# Patient Record
Sex: FEMALE | Race: WHITE | NOT HISPANIC OR LATINO | Employment: OTHER | URBAN - METROPOLITAN AREA
[De-identification: names, ages, dates, MRNs, and addresses within clinical notes are randomized per-mention and may not be internally consistent; named-entity substitution may affect disease eponyms.]

---

## 2017-07-05 ENCOUNTER — TELEPHONE (OUTPATIENT)
Dept: OBGYN | Facility: CLINIC | Age: 77
End: 2017-07-05

## 2017-07-05 NOTE — TELEPHONE ENCOUNTER
Mailbox full - patient needs to reschedule her 7/13 appt with Dr York. Provider does not see patient's for prolapse. Please help schedule with Dr. Mcdonough, Dr Huggins, Dr Moise or Dr Rene Valencia,   [facility]

## 2017-11-20 ENCOUNTER — APPOINTMENT (OUTPATIENT)
Dept: GENERAL RADIOLOGY | Facility: CLINIC | Age: 77
End: 2017-11-20
Attending: EMERGENCY MEDICINE
Payer: COMMERCIAL

## 2017-11-20 ENCOUNTER — HOSPITAL ENCOUNTER (OUTPATIENT)
Facility: CLINIC | Age: 77
Setting detail: OBSERVATION
Discharge: HOME | End: 2017-11-21
Attending: EMERGENCY MEDICINE | Admitting: INTERNAL MEDICINE
Payer: COMMERCIAL

## 2017-11-20 ENCOUNTER — APPOINTMENT (OUTPATIENT)
Dept: CT IMAGING | Facility: CLINIC | Age: 77
End: 2017-11-20
Attending: EMERGENCY MEDICINE
Payer: COMMERCIAL

## 2017-11-20 DIAGNOSIS — R07.9 ACUTE CHEST PAIN: ICD-10-CM

## 2017-11-20 LAB
ALBUMIN SERPL-MCNC: 3.8 G/DL (ref 3.4–5)
ALP SERPL-CCNC: 65 U/L (ref 40–150)
ALT SERPL W P-5'-P-CCNC: 32 U/L (ref 0–50)
ANION GAP SERPL CALCULATED.3IONS-SCNC: 7 MMOL/L (ref 3–14)
AST SERPL W P-5'-P-CCNC: 27 U/L (ref 0–45)
BASOPHILS # BLD AUTO: 0 10E9/L (ref 0–0.2)
BASOPHILS NFR BLD AUTO: 0.2 %
BILIRUB SERPL-MCNC: 0.4 MG/DL (ref 0.2–1.3)
BUN SERPL-MCNC: 16 MG/DL (ref 7–30)
CALCIUM SERPL-MCNC: 9.4 MG/DL (ref 8.5–10.1)
CHLORIDE SERPL-SCNC: 104 MMOL/L (ref 94–109)
CO2 SERPL-SCNC: 27 MMOL/L (ref 20–32)
CREAT SERPL-MCNC: 0.69 MG/DL (ref 0.52–1.04)
DIFFERENTIAL METHOD BLD: NORMAL
EOSINOPHIL # BLD AUTO: 0 10E9/L (ref 0–0.7)
EOSINOPHIL NFR BLD AUTO: 0.4 %
ERYTHROCYTE [DISTWIDTH] IN BLOOD BY AUTOMATED COUNT: 13 % (ref 10–15)
GFR SERPL CREATININE-BSD FRML MDRD: 82 ML/MIN/1.7M2
GLUCOSE SERPL-MCNC: 79 MG/DL (ref 70–99)
HCT VFR BLD AUTO: 41.6 % (ref 35–47)
HGB BLD-MCNC: 14.7 G/DL (ref 11.7–15.7)
IMM GRANULOCYTES # BLD: 0 10E9/L (ref 0–0.4)
IMM GRANULOCYTES NFR BLD: 0.1 %
INTERPRETATION ECG - MUSE: NORMAL
INTERPRETATION ECG - MUSE: NORMAL
LYMPHOCYTES # BLD AUTO: 3.5 10E9/L (ref 0.8–5.3)
LYMPHOCYTES NFR BLD AUTO: 35.8 %
MCH RBC QN AUTO: 31.6 PG (ref 26.5–33)
MCHC RBC AUTO-ENTMCNC: 35.3 G/DL (ref 31.5–36.5)
MCV RBC AUTO: 90 FL (ref 78–100)
MONOCYTES # BLD AUTO: 0.8 10E9/L (ref 0–1.3)
MONOCYTES NFR BLD AUTO: 8.1 %
NEUTROPHILS # BLD AUTO: 5.4 10E9/L (ref 1.6–8.3)
NEUTROPHILS NFR BLD AUTO: 55.4 %
NRBC # BLD AUTO: 0 10*3/UL
NRBC BLD AUTO-RTO: 0 /100
PLATELET # BLD AUTO: 238 10E9/L (ref 150–450)
POTASSIUM SERPL-SCNC: 3.7 MMOL/L (ref 3.4–5.3)
PROT SERPL-MCNC: 7.5 G/DL (ref 6.8–8.8)
RBC # BLD AUTO: 4.65 10E12/L (ref 3.8–5.2)
SODIUM SERPL-SCNC: 138 MMOL/L (ref 133–144)
TROPONIN I SERPL-MCNC: <0.015 UG/L (ref 0–0.04)
WBC # BLD AUTO: 9.8 10E9/L (ref 4–11)

## 2017-11-20 PROCEDURE — 25000128 H RX IP 250 OP 636: Performed by: EMERGENCY MEDICINE

## 2017-11-20 PROCEDURE — 84484 ASSAY OF TROPONIN QUANT: CPT | Performed by: EMERGENCY MEDICINE

## 2017-11-20 PROCEDURE — 71260 CT THORAX DX C+: CPT

## 2017-11-20 PROCEDURE — 85025 COMPLETE CBC W/AUTO DIFF WBC: CPT | Performed by: EMERGENCY MEDICINE

## 2017-11-20 PROCEDURE — 99285 EMERGENCY DEPT VISIT HI MDM: CPT | Mod: 25

## 2017-11-20 PROCEDURE — 25000132 ZZH RX MED GY IP 250 OP 250 PS 637: Performed by: EMERGENCY MEDICINE

## 2017-11-20 PROCEDURE — 80053 COMPREHEN METABOLIC PANEL: CPT | Performed by: EMERGENCY MEDICINE

## 2017-11-20 PROCEDURE — 84443 ASSAY THYROID STIM HORMONE: CPT | Performed by: EMERGENCY MEDICINE

## 2017-11-20 PROCEDURE — 93005 ELECTROCARDIOGRAM TRACING: CPT

## 2017-11-20 PROCEDURE — G0378 HOSPITAL OBSERVATION PER HR: HCPCS

## 2017-11-20 PROCEDURE — 25000125 ZZHC RX 250: Performed by: EMERGENCY MEDICINE

## 2017-11-20 PROCEDURE — 93005 ELECTROCARDIOGRAM TRACING: CPT | Mod: 76

## 2017-11-20 PROCEDURE — 71020 XR CHEST 2 VW: CPT

## 2017-11-20 PROCEDURE — 99220 ZZC INITIAL OBSERVATION CARE,LEVL III: CPT | Performed by: INTERNAL MEDICINE

## 2017-11-20 RX ORDER — NITROGLYCERIN 0.4 MG/1
0.4 TABLET SUBLINGUAL EVERY 5 MIN PRN
Status: COMPLETED | OUTPATIENT
Start: 2017-11-20 | End: 2017-11-20

## 2017-11-20 RX ORDER — MULTIPLE VITAMINS W/ MINERALS TAB 9MG-400MCG
1 TAB ORAL DAILY
COMMUNITY

## 2017-11-20 RX ORDER — NITROGLYCERIN 0.4 MG/1
0.4 TABLET SUBLINGUAL ONCE
Status: COMPLETED | OUTPATIENT
Start: 2017-11-20 | End: 2017-11-20

## 2017-11-20 RX ORDER — IOPAMIDOL 755 MG/ML
61 INJECTION, SOLUTION INTRAVASCULAR ONCE
Status: COMPLETED | OUTPATIENT
Start: 2017-11-20 | End: 2017-11-20

## 2017-11-20 RX ADMIN — IOPAMIDOL 61 ML: 755 INJECTION, SOLUTION INTRAVENOUS at 21:18

## 2017-11-20 RX ADMIN — NITROGLYCERIN 0.4 MG: 0.4 TABLET SUBLINGUAL at 19:42

## 2017-11-20 RX ADMIN — NITROGLYCERIN 0.4 MG: 0.4 TABLET SUBLINGUAL at 19:28

## 2017-11-20 RX ADMIN — SODIUM CHLORIDE 87 ML: 9 INJECTION, SOLUTION INTRAVENOUS at 21:18

## 2017-11-20 RX ADMIN — NITROGLYCERIN 0.4 MG: 0.4 TABLET SUBLINGUAL at 20:49

## 2017-11-20 RX ADMIN — NITROGLYCERIN 0.4 MG: 0.4 TABLET SUBLINGUAL at 19:35

## 2017-11-20 ASSESSMENT — ENCOUNTER SYMPTOMS
SHORTNESS OF BREATH: 0
WEAKNESS: 0
LIGHT-HEADEDNESS: 1
COUGH: 0

## 2017-11-20 NOTE — IP AVS SNAPSHOT
MRN:[id number]                      After Visit Summary   11/20/2017    Angelia Hughes    MRN: [id number]           Thank you!     Thank you for choosing [facility] for your care. Our goal is always to provide you with excellent care. Hearing back from our patients is one way we can continue to improve our services. Please take a few minutes to complete the written survey that you may receive in the mail after you visit with us. Thank you!        Patient Information     Date Of Birth          1940        Designated Caregiver       Most Recent Value    Caregiver    Will someone help with your care after discharge? no      About your hospital stay     You were admitted on:  November 20, 2017 You last received care in the:  [facility]    You were discharged on:  November 21, 2017        Reason for your hospital stay       Observation hospitalization for evaluation of chest pain.  Your stress echocardiogram was normal.                  Who to Call     For medical emergencies, please call 911.  For non-urgent questions about your medical care, please call your primary care provider or clinic, [phone]          Attending Provider     Provider Specialty    Vikki Seymour MD Emergency Medicine    Galvan, Lance Murillo MD Internal Medicine       Primary Care Provider Office Phone # Fax #    Kandis Madrid [phone]      After Care Instructions     Activity       Your activity upon discharge: activity as tolerated            Diet       Follow this diet upon discharge: -Low sodium, low cholesterol diet.                  Follow-up Appointments     Follow-up and recommended labs and tests        Follow up with primary care provider, Kandis Madrid MD, next week for hospital follow- up and evaluation of non-cardiac causes of chest pain.                  Pending Results     No orders found for last 3 day(s).            Statement of Approval     Ordered     "      17 1024  I have reviewed and agree with all the recommendations and orders detailed in this document.  EFFECTIVE NOW     Approved and electronically signed by:  Blaine Cunningham PA             Admission Information     Date & Time Provider Department Dept. Phone    2017 Lance Galvan MD [facility] Observ[facility] [phone]      Your Vitals Were     Blood Pressure Pulse Temperature Respirations Height Weight    159/84 (BP Location: Right arm) 83 97.7  F (36.5  C) (Oral) 16 1.626 m (5' 4\") 70.3 kg (154 lb 14.4 oz)    Pulse Oximetry BMI (Body Mass Index)                97% 26.59 kg/m2          MyChart Information     Populr lets you send messages to your doctor, view your test results, renew your prescriptions, schedule appointments and more. To sign up, go to [email or web address] . Click on \"Log in\" on the left side of the screen, which will take you to the Welcome page. Then click on \"Sign up Now\" on the right side of the page.     You will be asked to enter the access code listed below, as well as some personal information. Please follow the directions to create your username and password.     Your access code is: [email or web address]  Expires: 2018 10:03 PM     Your access code will  in 90 days. If you need help or a new code, please call your [facility] clinic or [phone].        Care EveryWhere ID     This is your Care EveryWhere ID. This could be used by other organizations to access your [facility] medical records  [id number]        Equal Access to Services     : Hadii marilee Gaona, waaxda luqadaha, qaybta kaalpaco neri . So [facility] [phone].    ATENCIÓN: Si habla español, tiene a lala disposición servicios gratuitos de asistencia lingüística. Llame al [phone].    We comply with applicable federal civil rights laws and [place] laws. We do not discriminate on the basis of race, color, national " origin, age, disability, sex, sexual orientation, or gender identity.               Review of your medicines      CONTINUE these medicines which have NOT CHANGED        Dose / Directions    ASPIRIN EC PO        Dose:  325 mg   Take 325 mg by mouth daily   Refills:  0       * multivitamin, therapeutic with minerals Tabs tablet        Dose:  1 tablet   Take 1 tablet by mouth daily   Refills:  0       * MACULAR HEALTH FORMULA PO        Dose:  1 tablet   Take 1 tablet by mouth daily   Refills:  0       * Notice:  This list has 2 medication(s) that are the same as other medications prescribed for you. Read the directions carefully, and ask your doctor or other care provider to review them with you.             Protect others around you: Learn how to safely use, store and throw away your medicines at www.[employer].org.             Medication List: This is a list of all your medications and when to take them. Check marks below indicate your daily home schedule. Keep this list as a reference.      Medications           Morning Afternoon Evening Bedtime As Needed    ASPIRIN EC PO   Take 325 mg by mouth daily   Last time this was given:  325 mg on 11/21/2017  9:01 AM                                * multivitamin, therapeutic with minerals Tabs tablet   Take 1 tablet by mouth daily                                * MACULAR HEALTH FORMULA PO   Take 1 tablet by mouth daily                                * Notice:  This list has 2 medication(s) that are the same as other medications prescribed for you. Read the directions carefully, and ask your doctor or other care provider to review them with you.

## 2017-11-20 NOTE — ED AVS SNAPSHOT
Emergency Department    [address] [place] [address]    Phone:  [phone]    Fax:  [phone]                                       Angelia Hughes   MRN: [id number]    Department:   Emergency Department   Date of Visit:  11/20/2017           After Visit Summary Signature Page     I have received my discharge instructions, and my questions have been answered. I have discussed any challenges I see with this plan with the nurse or doctor.    ..........................................................................................................................................  Patient/Patient Representative Signature      ..........................................................................................................................................  Patient Representative Print Name and Relationship to Patient    ..................................................               ................................................  Date                                            Time    ..........................................................................................................................................  Reviewed by Signature/Title    ...................................................              ..............................................  Date                                                            Time

## 2017-11-20 NOTE — IP AVS SNAPSHOT
[facility]    [address] [place] [address]    Phone:  [phone]                                       After Visit Summary   11/20/2017    Angelia Hughes    MRN: [id number]           After Visit Summary Signature Page     I have received my discharge instructions, and my questions have been answered. I have discussed any challenges I see with this plan with the nurse or doctor.    ..........................................................................................................................................  Patient/Patient Representative Signature      ..........................................................................................................................................  Patient Representative Print Name and Relationship to Patient    ..................................................               ................................................  Date                                            Time    ..........................................................................................................................................  Reviewed by Signature/Title    ...................................................              ..............................................  Date                                                            Time

## 2017-11-20 NOTE — ED AVS SNAPSHOT
Emergency Department    [address] [facility] [address]    Phone:  [phone]    Fax:  [phone]                                       Angelia Hughes   MRN: [id number]    Department:   Emergency Department   Date of Visit:  11/20/2017           Patient Information     Date Of Birth          1940        Your diagnoses for this visit were:     Acute chest pain        You were seen by Vikki Seymour MD.      24 Hour Appointment Hotline       To make an appointment at any [facility], call [phone] ([phone]). If you don't have a family doctor or clinic, we will help you find one. [facility] clinics are conveniently located to serve the needs of you and your family.             Review of your medicines      Our records show that you are taking the medicines listed below. If these are incorrect, please call your family doctor or clinic.        Dose / Directions Last dose taken    meclizine 25 MG tablet   Commonly known as:  ANTIVERT   Dose:  25 mg   Quantity:  30 tablet        Take 1 tablet (25 mg) by mouth every 6 hours as needed for dizziness   Refills:  1        SIMVASTATIN PO   Dose:  5 mg        Take 5 mg by mouth At Bedtime   Refills:  0                Procedures and tests performed during your visit     CBC with platelets + differential    CT Chest Pulmonary Embolism w Contrast    Chest XR,  PA & LAT    Comprehensive metabolic panel    EKG 12 lead    EKG 12-lead, tracing only    Troponin I (now)      Orders Needing Specimen Collection     None      Pending Results     Date and Time Order Name Status Description    11/20/2017 2040 CT Chest Pulmonary Embolism w Contrast Preliminary             Pending Culture Results     No orders found from 11/18/2017 to 11/21/2017.            Pending Results Instructions     If you had any lab results that were not finalized at the time of your Discharge, you can call the ED Lab Result RN at [phone]. You will be  contacted by this team for any positive Lab results or changes in treatment. The nurses are available 7 days a week from 10A to 6:30P.  You can leave a message 24 hours per day and they will return your call.        Test Results From Your Hospital Stay        11/20/2017  7:36 PM      Component Results     Component Value Ref Range & Units Status    WBC 9.8 4.0 - 11.0 10e9/L Final    RBC Count 4.65 3.8 - 5.2 10e12/L Final    Hemoglobin 14.7 11.7 - 15.7 g/dL Final    Hematocrit 41.6 35.0 - 47.0 % Final    MCV 90 78 - 100 fl Final    MCH 31.6 26.5 - 33.0 pg Final    MCHC 35.3 31.5 - 36.5 g/dL Final    RDW 13.0 10.0 - 15.0 % Final    Platelet Count 238 150 - 450 10e9/L Final    Diff Method Automated Method  Final    % Neutrophils 55.4 % Final    % Lymphocytes 35.8 % Final    % Monocytes 8.1 % Final    % Eosinophils 0.4 % Final    % Basophils 0.2 % Final    % Immature Granulocytes 0.1 % Final    Nucleated RBCs 0 0 /100 Final    Absolute Neutrophil 5.4 1.6 - 8.3 10e9/L Final    Absolute Lymphocytes 3.5 0.8 - 5.3 10e9/L Final    Absolute Monocytes 0.8 0.0 - 1.3 10e9/L Final    Absolute Eosinophils 0.0 0.0 - 0.7 10e9/L Final    Absolute Basophils 0.0 0.0 - 0.2 10e9/L Final    Abs Immature Granulocytes 0.0 0 - 0.4 10e9/L Final    Absolute Nucleated RBC 0.0  Final         11/20/2017  7:55 PM      Component Results     Component Value Ref Range & Units Status    Sodium 138 133 - 144 mmol/L Final    Potassium 3.7 3.4 - 5.3 mmol/L Final    Chloride 104 94 - 109 mmol/L Final    Carbon Dioxide 27 20 - 32 mmol/L Final    Anion Gap 7 3 - 14 mmol/L Final    Glucose 79 70 - 99 mg/dL Final    Urea Nitrogen 16 7 - 30 mg/dL Final    Creatinine 0.69 0.52 - 1.04 mg/dL Final    GFR Estimate 82 >60 mL/min/1.7m2 Final    Non  GFR Calc    GFR Estimate If Black >90 >60 mL/min/1.7m2 Final    African American GFR Calc    Calcium 9.4 8.5 - 10.1 mg/dL Final    Bilirubin Total 0.4 0.2 - 1.3 mg/dL Final    Albumin 3.8 3.4 - 5.0 g/dL  Final    Protein Total 7.5 6.8 - 8.8 g/dL Final    Alkaline Phosphatase 65 40 - 150 U/L Final    ALT 32 0 - 50 U/L Final    AST 27 0 - 45 U/L Final         11/20/2017  7:55 PM      Component Results     Component Value Ref Range & Units Status    Troponin I ES <0.015 0.000 - 0.045 ug/L Final    The 99th percentile for upper reference range is 0.045 ug/L.  Troponin values   in the range of 0.045 - 0.120 ug/L may be associated with risks of adverse   clinical events.           11/20/2017  8:29 PM      Narrative     CHEST TWO VIEWS    11/20/2017 8:07 PM     HISTORY: Chest pain.    COMPARISON: None.        Impression     IMPRESSION: Cardiomediastinal silhouette within normal limits. No  pleural effusion. No pneumothorax identified. No focal airspace  opacities. Mild scoliotic curvature of the spine.     LANDY NY MD         11/20/2017  9:44 PM      Narrative     CT CHEST WITH CONTRAST   11/20/2017  9:26 PM     HISTORY: Hypoxia. Chest pain.    COMPARISON: None.    TECHNIQUE: Following the uneventful administration of 61 mL Isovue  -370 intravenous contrast, helical sections were acquired through the  lungs according to the pulmonary embolism protocol. Coronal  reconstructions were generated. Radiation dose for this scan was  reduced using automated exposure control, adjustment of the mA and/or  kV according to the patient's size, or iterative reconstruction  technique.    FINDINGS: No visualized pulmonary embolus. The thoracic aorta is  normal in caliber without dissection. Atherosclerotic calcification in  the thoracic aorta and coronary arteries.    Mild atelectasis in the dependent aspects of both lungs. The lungs are  otherwise clear. No pleural or pericardial effusion. No enlarged lymph  nodes in the chest. 1.8 cm left lobe of the thyroid nodule. Small  hiatal hernia.    Scan through the upper abdomen is significant for 3 subcentimeter  low-attenuation lesions in the liver that are too small to  characterize.         Impression     IMPRESSION:   1. No visualized pulmonary embolus.  2. No evidence of active pulmonary disease.                Clinical Quality Measure: Blood Pressure Screening     Your blood pressure was checked while you were in the emergency department today. The last reading we obtained was  BP: 147/70 . Please read the guidelines below about what these numbers mean and what you should do about them.  If your systolic blood pressure (the top number) is less than 120 and your diastolic blood pressure (the bottom number) is less than 80, then your blood pressure is normal. There is nothing more that you need to do about it.  If your systolic blood pressure (the top number) is 120-139 or your diastolic blood pressure (the bottom number) is 80-89, your blood pressure may be higher than it should be. You should have your blood pressure rechecked within a year by a primary care provider.  If your systolic blood pressure (the top number) is 140 or greater or your diastolic blood pressure (the bottom number) is 90 or greater, you may have high blood pressure. High blood pressure is treatable, but if left untreated over time it can put you at risk for heart attack, stroke, or kidney failure. You should have your blood pressure rechecked by a primary care provider within the next 4 weeks.  If your provider in the emergency department today gave you specific instructions to follow-up with your doctor or provider even sooner than that, you should follow that instruction and not wait for up to 4 weeks for your follow-up visit.        Thank you for choosing [facility]       Thank you for choosing [facility] for your care. Our goal is always to provide you with excellent care. Hearing back from our patients is one way we can continue to improve our services. Please take a few minutes to complete the written survey that you may receive in the mail after you visit with us. Thank you!        [facility] Information     [employer] lets  "you send messages to your doctor, view your test results, renew your prescriptions, schedule appointments and more. To sign up, go to [email or web address] . Click on \"Log in\" on the left side of the screen, which will take you to the Welcome page. Then click on \"Sign up Now\" on the right side of the page.     You will be asked to enter the access code listed below, as well as some personal information. Please follow the directions to create your username and password.     Your access code is: [email or web address]  Expires: 2018 10:03 PM     Your access code will  in 90 days. If you need help or a new code, please call your [facility] clinic or [phone].        Care EveryWhere ID     This is your Care EveryWhere ID. This could be used by other organizations to access your [facility] medical records  [id number]        Equal Access to Services     IRTO PALOMO : Hadabida Gaona, waaxda dwayne, qaybta kaalmada jeyson, paco moore . So [facility] [phone].    ATENCIÓN: Si habla español, tiene a lala disposición servicios gratuitos de asistencia lingüística. Llame al [phone].    We comply with applicable federal civil rights laws and [place] laws. We do not discriminate on the basis of race, color, national origin, age, disability, sex, sexual orientation, or gender identity.            After Visit Summary       This is your record. Keep this with you and show to your community pharmacist(s) and doctor(s) at your next visit.                  "

## 2017-11-21 ENCOUNTER — APPOINTMENT (OUTPATIENT)
Dept: CARDIOLOGY | Facility: CLINIC | Age: 77
End: 2017-11-21
Attending: INTERNAL MEDICINE
Payer: COMMERCIAL

## 2017-11-21 VITALS
DIASTOLIC BLOOD PRESSURE: 84 MMHG | WEIGHT: 154.9 LBS | BODY MASS INDEX: 26.44 KG/M2 | TEMPERATURE: 97.7 F | HEIGHT: 64 IN | OXYGEN SATURATION: 97 % | RESPIRATION RATE: 16 BRPM | SYSTOLIC BLOOD PRESSURE: 159 MMHG | HEART RATE: 83 BPM

## 2017-11-21 LAB
TROPONIN I SERPL-MCNC: <0.015 UG/L (ref 0–0.04)
TROPONIN I SERPL-MCNC: <0.015 UG/L (ref 0–0.04)
TSH SERPL DL<=0.005 MIU/L-ACNC: 3.62 MU/L (ref 0.4–4)

## 2017-11-21 PROCEDURE — 84443 ASSAY THYROID STIM HORMONE: CPT | Performed by: INTERNAL MEDICINE

## 2017-11-21 PROCEDURE — 36415 COLL VENOUS BLD VENIPUNCTURE: CPT | Performed by: INTERNAL MEDICINE

## 2017-11-21 PROCEDURE — 25500064 ZZH RX 255 OP 636: Performed by: INTERNAL MEDICINE

## 2017-11-21 PROCEDURE — G0378 HOSPITAL OBSERVATION PER HR: HCPCS

## 2017-11-21 PROCEDURE — 84484 ASSAY OF TROPONIN QUANT: CPT | Mod: 91 | Performed by: INTERNAL MEDICINE

## 2017-11-21 PROCEDURE — 25000132 ZZH RX MED GY IP 250 OP 250 PS 637: Performed by: INTERNAL MEDICINE

## 2017-11-21 PROCEDURE — 40000264 ECHO STRESS TEST WITH LUMASON

## 2017-11-21 PROCEDURE — 99217 ZZC OBSERVATION CARE DISCHARGE: CPT | Performed by: PHYSICIAN ASSISTANT

## 2017-11-21 PROCEDURE — 93321 DOPPLER ECHO F-UP/LMTD STD: CPT | Mod: 26 | Performed by: INTERNAL MEDICINE

## 2017-11-21 PROCEDURE — 93350 STRESS TTE ONLY: CPT | Mod: 26 | Performed by: INTERNAL MEDICINE

## 2017-11-21 PROCEDURE — 93325 DOPPLER ECHO COLOR FLOW MAPG: CPT | Mod: 26 | Performed by: INTERNAL MEDICINE

## 2017-11-21 PROCEDURE — 93018 CV STRESS TEST I&R ONLY: CPT | Performed by: INTERNAL MEDICINE

## 2017-11-21 PROCEDURE — 93016 CV STRESS TEST SUPVJ ONLY: CPT | Performed by: INTERNAL MEDICINE

## 2017-11-21 RX ORDER — CALCIUM CARBONATE 500 MG/1
1000 TABLET, CHEWABLE ORAL 3 TIMES DAILY PRN
Status: DISCONTINUED | OUTPATIENT
Start: 2017-11-21 | End: 2017-11-21 | Stop reason: HOSPADM

## 2017-11-21 RX ORDER — NALOXONE HYDROCHLORIDE 0.4 MG/ML
.1-.4 INJECTION, SOLUTION INTRAMUSCULAR; INTRAVENOUS; SUBCUTANEOUS
Status: DISCONTINUED | OUTPATIENT
Start: 2017-11-21 | End: 2017-11-21 | Stop reason: HOSPADM

## 2017-11-21 RX ORDER — ACETAMINOPHEN 650 MG/1
650 SUPPOSITORY RECTAL EVERY 4 HOURS PRN
Status: DISCONTINUED | OUTPATIENT
Start: 2017-11-21 | End: 2017-11-21 | Stop reason: HOSPADM

## 2017-11-21 RX ORDER — NITROGLYCERIN 0.4 MG/1
0.4 TABLET SUBLINGUAL EVERY 5 MIN PRN
Status: DISCONTINUED | OUTPATIENT
Start: 2017-11-21 | End: 2017-11-21 | Stop reason: HOSPADM

## 2017-11-21 RX ORDER — ALUMINA, MAGNESIA, AND SIMETHICONE 2400; 2400; 240 MG/30ML; MG/30ML; MG/30ML
30 SUSPENSION ORAL EVERY 4 HOURS PRN
Status: DISCONTINUED | OUTPATIENT
Start: 2017-11-21 | End: 2017-11-21 | Stop reason: HOSPADM

## 2017-11-21 RX ORDER — ACETAMINOPHEN 325 MG/1
650 TABLET ORAL EVERY 4 HOURS PRN
Status: DISCONTINUED | OUTPATIENT
Start: 2017-11-21 | End: 2017-11-21 | Stop reason: HOSPADM

## 2017-11-21 RX ORDER — LIDOCAINE 40 MG/G
CREAM TOPICAL
Status: DISCONTINUED | OUTPATIENT
Start: 2017-11-21 | End: 2017-11-21 | Stop reason: HOSPADM

## 2017-11-21 RX ADMIN — ASPIRIN 325 MG: 325 TABLET, DELAYED RELEASE ORAL at 09:01

## 2017-11-21 RX ADMIN — SULFUR HEXAFLUORIDE 5 ML: KIT at 08:12

## 2017-11-21 NOTE — ED PROVIDER NOTES
"  History     Chief Complaint:  Chest Pain     HPI   Angelia Hughes is a 77 year old female who presents to the emergency department today for evaluation of chest pain. The patient states she had just finished planting all day long and was driving home around [id number] when she had a sudden onset of chest pain. She describes the pain as an aching heaviness that is primarily localized to the middle of her chest. The pain did radiate to both shoulders. At its worst, the pain was at a 5. She took Tums at home in case it was related to acid reflux but did not help. Since the pain persisted, she came into the ED for further evaluation. The patient denies any nausea, sweatiness, shortness of breath, recent illness, leg pain, leg swelling, cough, or exertional symptoms, but she has had some lightheadedness with the pain. The patient states the pain is between a 1-2 on my exam after she had received Nitroglycerin. Nitro did improve pain. The patient's daughter notes she has been under significant stress recently. Already took 324 mg aspirin before coming into ED.    Allergies:  Codeine  \"All antibiotics\"  Sulfa Drugs    Medications:    Crestor  Meclizine  Aspiring 325 mg    Past Medical History:    Bell's palsy     Past Surgical History:    Hysterectomy     Family History:    History reviewed. No pertinent family history.     Social History:  The patient was accompanied to the ED by her family.  Smoking Status: Never Smoker  Alcohol Use: Positive  Marital Status:       Review of Systems   Respiratory: Negative for cough and shortness of breath.    Cardiovascular: Positive for chest pain. Negative for leg swelling.   Neurological: Positive for light-headedness. Negative for weakness.   All other systems reviewed and are negative.    Physical Exam     Patient Vitals for the past 24 hrs:   BP Temp Temp src Pulse Heart Rate Resp SpO2 Height Weight   11/20/17 2124 147/70 - - - 78 17 99 % - -   11/20/17 2048 132/69 - - " "- 79 9 97 % - -   11/20/17 1949 112/69 - - - - - 91 % - -   11/1940 128/75 - - - - - (!) 88 % - -   11/20/17 1935 121/76 - - - - - 91 % - -   11/20/17 1922 - - - - - - 91 % - -   11/20/17 1921 (!) 144/104 - - - - - - - -   11/20/17 1902 161/80 97.9  F (36.6  C) Oral 83 - 20 96 % 1.626 m (5' 4\") 69.9 kg (154 lb)     Physical Exam  General: Resting comfortably on the gurney  Eyes:  The pupils are equal and round  ENT:    Moist mucous membranes  Neck:  Normal range of motion  CV:  Regular rate and rhythm    Skin warm and well perfused   Resp:  Lungs are clear    Non-labored    No rales    No wheezing   GI:  Abdomen is soft, there is no rigidity    No distension    No rebound tenderness     No abdominal tenderness  MS:  Normal muscular tone  Skin:  No rash or acute skin lesions noted  Neuro:   Awake, alert.      Speech is normal and fluent.    Face is symmetric.     Moves all extremities  Psych: Normal affect.  Appropriate interactions.    Emergency Department Course   ECG (19:04:14):  Rate 82 bpm. SC interval 174. QRS duration 72. QT/QTc 374/436. P-R-T axes 52 11 53. Normal sinus rhythm. Possible left atrial enlargement. Low voltage QRS. Cannot rule out anteroseptal infarct, age undetermined. Abnormal ECG. No significant changes compared to ECG dated 9/20/16. Interpreted at 2026 by Vikki Seymour MD.    ECG (21:39:00):  Rate 70 bpm. SC interval 180. QRS duration 80. QT/QTc 422/455. P-R-T axes 60 26 62. Normal sinus rhythm. Low voltage QRS. Borderline ECG. No significant change compared to ECG dated 11/20/17. Interpreted at 2143 by Vikki Seymour MD.    Imaging:  Radiology findings were communicated with the patient who voiced understanding of the findings.    Chest XR, PA & LAT:  Cardiomediastinal silhouette within normal limits. No pleural effusion. No pneumothorax identified. No focal airspace opacities. Mild scoliotic curvature of the spine.  As read by Radiology.    CT Chest Pulmonary " Embolism w Contrast:  1. No visualized pulmonary embolus.  2. No evidence of active pulmonary disease.  As read by Radiology.    Laboratory:  CBC: WNL (WBC 9.8, HGB 14.7, )  CMP: WNL (Creatinine 0.69)  Troponin: <0.015    Interventions:  1942: 0.4 mg Nitroglycerin sublingual  2049: 0.4 mg Nitroglycerin sublingual    Emergency Department Course:  Past medical records, nursing notes, and vitals reviewed.  2029: I performed an exam of the patient and obtained history, as documented above.  ECG performed, results above. Patient placed on oxygen due to saturations at 88%.  IV inserted and blood drawn. The patient was placed on continuous cardiac monitoring and pulse oximetry.  The patient was sent for a chest x-ray and a chest CT (PE protocol) while in the emergency department, findings above.    2139: ECG repeated, results above.    2157: I rechecked the patient. Explained findings to the patient and her family.    2210: I spoke to Dr. Galvan of the hospitalist service who accepts the patient for admission.     2214: The patient was taken off oxygen at this time. Her saturations remained stable on room air.    Findings and plan explained to the patient who consents to admission. Discussed the patient with Dr. Galvan, who will admit the patient to an observation bed for further monitoring, evaluation, and treatment.     Impression & Plan      Medical Decision Making:  Angelia Hughes is a 77 year old female who presents to the emergency department today for evaluation of chest pain. Vital signs are normal on arrival to the ED. She did become mildly hypoxic and placed on oxygen, but this was able to be removed later in ED course. Chest pain is concerning for possible ACS. ECG showed sinus rhythm without any acute ischemic changes. Troponin is negative. I did obtain a CT of her chest given that she was hypoxic of unclear etiology and there was no PE identified. Nitroglycerin did improve her symptoms. I will have her  come into the hospital for cardiac rule-out. Doubt aortic dissection given no severe chest pain, radiation to back, neuro deficits, and had narrow mediastinum. I discussed the patient with Dr. Galvan who agreed to accept the patient for observation.    Diagnosis:    ICD-10-CM   1. Acute chest pain R07.9     Disposition: Admitted to observation    Heidi Garza  11/20/2017   EMERGENCY DEPARTMENT    I, Heidi Garza, am serving as a scribe at 8:29 PM on 11/20/2017 to document services personally performed by Vikki Seymour MD based on my observations and the provider's statements to me.          Vikki Seymour MD  11/21/17 024

## 2017-11-21 NOTE — H&P
[facility]    History and Physical  Hospitalist       Date of Admission:  11/20/2017    Assessment & Plan   Angelia Hughes is a 77 year old female who presents with substernal chest pressure radiating to bilateral shoulders as well as acid reflux type symptoms after helping her daughter over the course of the day.    Chest pain: Atypical in nature. Nonexertional. Somewhat concerning in that it was substernal with radiation to bilateral shoulders and relieved with nitroglycerin. Patient's last stress imaging was a exercise TTE in 2010 with ST depression in inferior leads and V3-V5 with exertion, normal echo.  -Consider initiation of pravastatin in the outpatient setting. Patient has experienced leg cramps on multiple statin therapies, though given patient's fairly functional status with increased risk of cardiac events, consider continuing statin therapy  -GI cocktail, Tums available for heartburn  -Continue daily 325 aspirin  -Exercise stress echocardiogram in a.m.  -Completing troponin trend    Hyperlipidemia: Patient states that she was on simvastatin for many years, though subsequently developed leg cramps. Subsequently placed on Crestor, again with leg cramps.  -As above consider initiation of pravastatin in the outpatient setting. Will not order at this time given observation admission.    Thyroid nodule: Patient with incidental finding of a 1.8 cm left thyroid nodule on CT of chest.  -TSH pending  -Anticipate Outpatient follow-up as per primary care provider. Discussed finding with patient and family.     DVT Prophylaxis: Low Risk/Ambulatory with no VTE prophylaxis indicated    Code Status: Full Code    Disposition: Expected discharge potentially 11/21/17 following stress imaging     Primary Care Physician   Kandis Madrid MD    Chief Complaint   Substernal chest painistory is obtained from the patient, chart review,  patient's daughter and  at bedside, discussion with Dr Seymour  in the emergency department, review of outside records including 2010 stress echocardiogram demonstrating no evidence of ischemia by echocardiogram, though EKG changes positive for ischemic changes (1 mm ST depression in inferior leads as well as V3-V5) which returned to baseline within first minute of recovery.    History of Present Illness   Angelia Hughes is a 77 year old female who presents with substernal chest discomfort with onset approximately 4:00 PM. Patient describes helping her daughter prepare her shop for the holidays by planting seasonal flowers in pots throughout the day today (not aerobic activity), though as driving home at approximate 4 PM, developed what she thought was heartburn as well as some midsternal chest pressure. Patient describes not having eaten throughout the day, and had coffee shortly after leaving her daughter. Describes having heartburn in the past, and this was a familiar sensation, though substernal chest pressure and discomfort was more worrisome. At home, she took 325 mg of aspirin, and as discomfort did not go away, presented to the emergency department. In the emergency department, an initial troponin was undetectable. EKG demonstrated normal sinus rhythm. Patient received nitroglycerin in the emergency department which substantially improved her central chest pressure. Nitroglycerin also improved her bilateral shoulder pain.    Patient describes being under significant stress. Her  recently was hospitalized for a cardiac event and recently discharged home where she has been caring for him. Additionally, he has stage V kidney disease and they are declining to pursue dialysis. As such, there is underlying stress regarding end-of-life issues and attempts to improve his quality of life as well as longevity with dietary changes.    At baseline, patient with minimal aerobic activity. States that she walks her dog approximately 3 blocks a day, though this includes  multiple stops for the dog. Denies any chest discomfort or shortness of breath with activity such as ambulate in the dog or walking up stairs.    Patient occasionally with palpitations for which she has been evaluated at outside facilities. Thought to be secondary to anxiety.  Recently taken off of her statin medication as she was experiencing leg cramps even after a statin change.    Patient has had several cardiac stress tests historically. Most recently appears to be a stress echocardiogram in 2010 through outside hospital. At that time, no ischemia was noted by echocardiogram, though EKG changes were positive for ischemic changes including 1 mm ST depressions in inferior leads as well as V3-V5. These resolved within the first minute of recovery.    Further back, patient with a CT coronary angiogram in 2007. This demonstrated a coronary calcium score 72.4, placing patient in the 50th to the 75th percentile for age. Mild proximal LAD stenosis was most notable finding.    Past Medical History    I have reviewed this patient's medical history and updated it with pertinent information if needed.   Past Medical History:   Diagnosis Date     Bell's palsy    TIA  Osteoporosis  Hyperlipidemia    Past Surgical History   I have reviewed this patient's surgical history and updated it with pertinent information if needed.  Past Surgical History:   Procedure Laterality Date     HYSTERECTOMY     tonsillectomy  Hemorrhoidectomy    Prior to Admission Medications   Prior to Admission Medications   Prescriptions Last Dose Informant Patient Reported? Taking?   ASPIRIN EC PO 11/20/2017 at Unknown time Self Yes Yes   Sig: Take 325 mg by mouth daily   Multiple Vitamins-Minerals (MACULAR HEALTH FORMULA PO) 11/20/2017 at Unknown time Self Yes Yes   Sig: Take 1 tablet by mouth daily   multivitamin, therapeutic with minerals (THERA-VIT-M) TABS tablet 11/20/2017 at Unknown time Self Yes Yes   Sig: Take 1 tablet by mouth daily     "  Facility-Administered Medications: None     Allergies   Allergies   Allergen Reactions     Codeine Hives     No Clinical Screening - See Comments      \"all antibiotics\"     Sulfa Drugs        Social History   I have reviewed this patient's social history and updated it with pertinent information if needed. Angelia Hughes  reports that she has never smoked. She does not have any smokeless tobacco history on file. She reports that she drinks alcohol (wine with dinner). She reports that she does not use illicit drugs.    Family History   I have reviewed this patient's family history and updated it with pertinent information if needed.   Brother with heart disease and valvulopathy s/p replacement  Father with heart disease including myocardial infarction  Mother with cancer of the bone  Sister with emphysema/COPD  Daughter with Hashimoto's thyroiditis     Review of Systems   The 10 point Review of Systems is negative other than noted in the HPI or here;   No diarrhea.   Has had heartburn in the past.     Physical Exam   Temp: 97.9  F (36.6  C) Temp src: Oral BP: 147/70 Pulse: 83 Heart Rate: 78 Resp: 17 SpO2: 99 % O2 Device: Nasal cannula    Vital Signs with Ranges  Temp:  [97.9  F (36.6  C)] 97.9  F (36.6  C)  Pulse:  [83] 83  Heart Rate:  [78-81] 81  Resp:  [9-20] 11  BP: (112-161)/() 131/70  SpO2:  [88 %-99 %] 95 %  154 lbs 0 oz    Constitutional: no acute distress, alert, conversant  Eyes: no scleral icterus or injection  HEENT: moist mucous membranes. Unable to palpate thyroid nodule. No bruit, no tendernes  Respiratory: breath sounds clear bilaterally to auscultation, no wheezes, no crackles  Cardiovascular: regular rate and rhythm, no murmur   GI: abdomen soft, non-tender, normoactive bowel sounds, no masses  Lymph/Hematologic: no lower extremity swelling  Skin: no rashes  Musculoskeletal: muscular tone intact in all extremities  Neurologic: mental status grossly intact, no focal deficits, " alert  Psychiatric: normal affect    Data   Data reviewed today:  I personally reviewed the EKG tracing showing Normal sinus rhythm, Q waves in V1-V3.    Recent Labs  Lab 11/20/17  1915   WBC 9.8   HGB 14.7   MCV 90         POTASSIUM 3.7   CHLORIDE 104   CO2 27   BUN 16   CR 0.69   ANIONGAP 7   BUTCH 9.4   GLC 79   ALBUMIN 3.8   PROTTOTAL 7.5   BILITOTAL 0.4   ALKPHOS 65   ALT 32   AST 27   TROPI <0.015       Recent Results (from the past 24 hour(s))   Chest XR,  PA & LAT    Narrative    CHEST TWO VIEWS    11/20/2017 8:07 PM     HISTORY: Chest pain.    COMPARISON: None.      Impression    IMPRESSION: Cardiomediastinal silhouette within normal limits. No  pleural effusion. No pneumothorax identified. No focal airspace  opacities. Mild scoliotic curvature of the spine.     LANDY NY MD   CT Chest Pulmonary Embolism w Contrast    Narrative    CT CHEST WITH CONTRAST   11/20/2017  9:26 PM     HISTORY: Hypoxia. Chest pain.    COMPARISON: None.    TECHNIQUE: Following the uneventful administration of 61 mL Isovue  -370 intravenous contrast, helical sections were acquired through the  lungs according to the pulmonary embolism protocol. Coronal  reconstructions were generated. Radiation dose for this scan was  reduced using automated exposure control, adjustment of the mA and/or  kV according to the patient's size, or iterative reconstruction  technique.    FINDINGS: No visualized pulmonary embolus. The thoracic aorta is  normal in caliber without dissection. Atherosclerotic calcification in  the thoracic aorta and coronary arteries.    Mild atelectasis in the dependent aspects of both lungs. The lungs are  otherwise clear. No pleural or pericardial effusion. No enlarged lymph  nodes in the chest. 1.8 cm left lobe of the thyroid nodule. Small  hiatal hernia.    Scan through the upper abdomen is significant for 3 subcentimeter  low-attenuation lesions in the liver that are too small to  characterize.       Impression    IMPRESSION:   1. No visualized pulmonary embolus.  2. No evidence of active pulmonary disease.

## 2017-11-21 NOTE — ED NOTES
"[facility]  ED Nurse Handoff Report    ED Chief complaint: Chest Pain (Central chest pressure started 1 1/2 hours ago while driving with radiation to bilateral shoulders. No SOB. Also c/o feeling lightheaded. Had been planting all day.)      ED Diagnosis:   Final diagnoses:   Acute chest pain       Code Status: Full Code    Allergies:   Allergies   Allergen Reactions     Codeine Hives     No Clinical Screening - See Comments      \"all antibiotics\"     Sulfa Drugs        Activity level - Baseline/Home:  Independent    Activity Level - Current:   Stand with Assist     Needed?: No    Isolation: No  Infection: Not Applicable    Bariatric?: No    Vital Signs:   Vitals:    11/1940 11/20/17 1949 11/20/17 2048 11/20/17 2124   BP: 128/75 112/69 132/69 147/70   Pulse:       Resp:   9 17   Temp:       TempSrc:       SpO2: (!) 88% 91% 97% 99%   Weight:       Height:           Cardiac Rhythm: ,   Cardiac  Cardiac Rhythm: Normal sinus rhythm    Pain level: 0-10 Pain Scale: 4    Is this patient confused?: No    Patient Report: Initial Complaint: chest pain  Focused Assessment: Angelia Hughes is a 77 year old female who presents to the emergency department today for evaluation of chest pain. The patient states she had just finished planting all day long and was driving home around [phone] when she had a sudden onset of chest pain. She describes the pain as an aching heaviness that is primarily localized to the middle of her chest. The pain did radiate to both shoulders. At its worst, the pain was at a 5. She took Tums at home in case it was related to acid reflux, and they helped somewhat. Since the pain persisted, she came into the ED for further evaluation. The patient denies any nausea, sweatiness, shortness of breath, recent illness, leg pain, leg swelling, cough, or exertional symptoms, but she has had some lightheadedness with the pain. The patient states the pain is between a 1-2 on my " exam after she had received Nitroglycerin. The patient's daughter notes she has been under significant stress recently.  Tests Performed: blood work, chest x-ray, CT scan, EKG  Abnormal Results: none  Treatments provided: NTG SL x4    Family Comments:  and daughter    OBS brochure/video discussed/provided to patient: N/A    ED Medications:   Medications   nitroGLYcerin (NITROSTAT) sublingual tablet 0.4 mg (0.4 mg Sublingual Given 11/20/17 1942)   nitroGLYcerin (NITROSTAT) sublingual tablet 0.4 mg (0.4 mg Sublingual Given 11/20/17 2049)   iopamidol (ISOVUE-370) solution 61 mL (61 mLs Intravenous Given 11/20/17 2118)   Saline Flush (87 mLs Intravenous Given 11/20/17 2118)       Drips infusing?:  No      ED NURSE PHONE NUMBER: *[id number]

## 2017-11-21 NOTE — ED NOTES
Presents to ED with .  Around 1730 this evening started having chest discomfort after planting winter foliage with daughter this afternoon.  Initial rating 5/10 and located in center of chest and has radiated to both shoulders.  Is currently to center of chest.  Does have some slight increased discomfort with palpation of center of chest.  Denies cardiac problems in the past.  Denies any shortness of breath.  Denies n/v.

## 2017-11-21 NOTE — PROGRESS NOTES
Observation Goals:  - Serial troponins and stress test complete - partially met  - Seen and cleared by consultant if applicable - N/A  - Adequate pain control on oral analgesia - met  - Vital signs normal or at patient baseline - met  - Safe disposition plan has been identified - not met

## 2017-11-21 NOTE — PLAN OF CARE
Problem: Patient Care Overview  Goal: Discharge Needs Assessment  Outcome: Improving  Arrived from ED around 0000.  A/O.  VSS.  Tele NSR.  Serial trops, negative.  Denies chest pain, SOB.  Clear liquid diet since 0000 for stress test today.  Up SBA.  Continue to monitor.

## 2017-11-21 NOTE — PLAN OF CARE
Problem: Patient Care Overview  Goal: Plan of Care/Patient Progress Review  Outcome: Adequate for Discharge Date Met: 11/21/17  Pt A/O, VSS on RA. Discharge instructions reviewed with patient, questions answered. No discharge medications. Pt discharged to home with all belongings.

## 2017-11-21 NOTE — PROGRESS NOTES
Observation Goals:  - Serial troponins and stress test complete - partially met. Scheduled for stress echo this am   - Seen and cleared by consultant if applicable - N/A  - Adequate pain control on oral analgesia - met  - Vital signs normal or at patient baseline - met  - Safe disposition plan has been identified - not met

## 2017-11-21 NOTE — PHARMACY-ADMISSION MEDICATION HISTORY
Admission medication history interview status for the 11/20/2017  admission is complete. See EPIC admission navigator for prior to admission medications     Medication history source reliability:Good    Actions taken by pharmacist (provider contacted, etc):None     Additional medication history information not noted on PTA med list :all of them    Medication reconciliation/reorder completed by provider prior to medication history? No    Time spent in this activity: 7 min    Prior to Admission medications    Medication Sig Last Dose Taking? Auth Provider   ASPIRIN EC PO Take 325 mg by mouth daily 11/20/2017 at Unknown time Yes Unknown, Entered By History   multivitamin, therapeutic with minerals (THERA-VIT-M) TABS tablet Take 1 tablet by mouth daily 11/20/2017 at Unknown time Yes Unknown, Entered By History   Multiple Vitamins-Minerals (MACULAR HEALTH FORMULA PO) Take 1 tablet by mouth daily 11/20/2017 at Unknown time Yes Unknown, Entered By History

## 2017-11-22 NOTE — DISCHARGE SUMMARY
[facility]    Discharge Summary  Hospitalist    Date of Admission:  11/20/2017  Date of Discharge:  11/21/2017  Discharging Provider: Blaine Cunningham  Date of Service (when I saw the patient): 11/21/17    Discharge Diagnoses   Atypical chest pain, consider GERD vs anxiety  Hyperlipidemia  Incidental thyroid nodule    History of Present Illness   Angelia Hughes is a 77 year old female who presents with substernal chest discomfort. Patient describes helping her daughter prepare her shop for the holidays by planting seasonal flowers in pots throughout the day (not aerobic activity), as driving home, developed what she thought was heartburn as well as some midsternal chest pressure. Patient describes not having eaten throughout the day, and had coffee shortly after leaving her daughter. Describes having heartburn in the past, and this was a familiar sensation, though substernal chest pressure and discomfort was more worrisome. At home, she took 325 mg of aspirin, and as discomfort did not go away, presented to the emergency department. In the emergency department, an initial troponin was undetectable. EKG demonstrated normal sinus rhythm. Patient received nitroglycerin in the emergency department which substantially improved her central chest pressure. Nitroglycerin also improved her bilateral shoulder pain.     Patient describes being under significant stress. Her  recently was hospitalized for a cardiac event and recently discharged home where she has been caring for him. Additionally, he has stage V kidney disease and they are declining to pursue dialysis. As such, there is underlying stress regarding end-of-life issues and attempts to improve his quality of life as well as longevity with dietary changes.     Patient has had several cardiac stress tests historically. Most recently, a stress echocardiogram in 2010 through outside hospital. At that time, no ischemia was noted by  echocardiogram, though EKG changes were positive for ischemic changes including 1 mm ST depressions in inferior leads as well as V3-V5. These resolved within the first minute of recovery.  Further back, patient with a CT coronary angiogram in 2007. This demonstrated a coronary calcium score 72.4, placing patient in the 50th to the 75th percentile for age. Mild proximal LAD stenosis was most notable finding.    Hospital Course   Angelia Hughes is a 77 year old female who presents with substernal chest pressure radiating to bilateral shoulders as well as acid reflux type symptoms and was admitted to the observation unit for cardiac evaluation.      Chest pain: Atypical in nature. Nonexertional. Initially concerning in that it was substernal with radiation to bilateral shoulders and relieved with nitroglycerin. Patient's last stress imaging was a exercise TTE in 2010 with ST depression in inferior leads and V3-V5 with exertion, normal echo.  - Serial troponin negative x3  - Consider initiation of pravastatin in the outpatient setting. Patient has experienced leg cramps on multiple statin therapies, though given patient's fairly functional status with increased risk of cardiac events, consider continuing statin therapy  - Pt to try Tums for heartburn.  Consider PPI if sx reoccur.  - Continue daily 325 aspirin  - Exercise stress echocardiogram negative for inducible ischemia.  Non-diagnostic EKG abnormalities are unchanged from previous stress echo from 2010.  - Patient was encouraged to follow up with PCP for further workup of non-cardiac causes of chest pain.     Hyperlipidemia: Patient states that she was on simvastatin for many years, though subsequently developed leg cramps. Subsequently placed on Crestor, again with leg cramps.  - As above consider initiation of pravastatin in the outpatient setting. Will not order at this time given observation admission.      Thyroid nodule: Patient with incidental finding of a  1.8 cm left thyroid nodule on CT of chest.  - TSH is within normal limits  - Outpatient follow-up as per primary care provider. Discussed finding with patient.     Blaine Cunningham PA-C    Significant Results and Procedures    Stress ECHO 11/21/17  Interpretation Summary  Normal stress echocardiogram with no wall motion abnormalities on the post  exercise images. The stress EKG is nondiagnostic for ischemia.  _____________________________________________________________________________  __     Stress  The patient exercised 6:30 min.  Exercise was stopped due to fatigue.  There was a normal BP response to exercise.  The patient exhibited no chest pain during exercise.  A treadmill exercise test according to the Sinan protocol was performed.  Target Heart Rate was achieved.  Horizontal to upsloping ST depressions of 0.5-1.0 mm were noted in the  inferolateral leads at peak exercise which are nondiagnostic for ischemia.  Left ventricular cavity size decreases with exercise.  Global LV systolic function augments with exercise.  The visual ejection fraction is estimated at >70%.  No wall motion abnormalities are noted on the post exercise images.  Normal resting wall motion and no stress-induced wall motion abnormality.     Baseline  The patient is in normal sinus rhythm.  Normal left ventricular function and wall motion at rest.  The visual ejection fraction is estimated at 55-60%.    Pending Results   None    Code Status   Full Code       Primary Care Physician   Kandis Madrid MD    I personally saw the patient face to face on day of discharge to discuss test results and plan of care.    Discharge Disposition   Discharged to home  Condition at discharge: Stable    Consultations This Hospital Stay   None    Discharge Orders     Reason for your hospital stay   Observation hospitalization for evaluation of chest pain.  Your stress echocardiogram was normal.     Follow-up and recommended labs and tests    Follow  "up with primary care provider, Kandis Madrid MD, next week for hospital follow- up and evaluation of non-cardiac causes of chest pain.     Activity   Your activity upon discharge: activity as tolerated     Full Code     Diet   Follow this diet upon discharge: -Low sodium, low cholesterol diet.       Discharge Medications   Discharge Medication List as of 11/21/2017 10:33 AM      CONTINUE these medications which have NOT CHANGED    Details   ASPIRIN EC PO Take 325 mg by mouth daily, Historical      multivitamin, therapeutic with minerals (THERA-VIT-M) TABS tablet Take 1 tablet by mouth daily, Historical      Multiple Vitamins-Minerals (MACULAR HEALTH FORMULA PO) Take 1 tablet by mouth daily, Historical           Allergies   Allergies   Allergen Reactions     Codeine Hives     No Clinical Screening - See Comments      \"all antibiotics\"     Sulfa Drugs      Data   Most Recent 3 CBC's:  Recent Labs   Lab Test  11/20/17 1915 09/20/16   0522   WBC  9.8  7.4   HGB  14.7  14.2   MCV  90  89   PLT  238  189      Most Recent 3 BMP's:  Recent Labs   Lab Test  11/20/17 1915 09/20/16   0522   NA  138  139   POTASSIUM  3.7  3.6   CHLORIDE  104  107   CO2  27  26   BUN  16  18   CR  0.69  0.60   ANIONGAP  7  6   BUTCH  9.4  8.6   GLC  79  119*     Most Recent 2 LFT's:  Recent Labs   Lab Test  11/20/17 1915 09/20/16   0522   AST  27  24   ALT  32  24   ALKPHOS  65  55   BILITOTAL  0.4  0.4       Most Recent 3 Troponin's:  Recent Labs   Lab Test  11/21/17   0415  11/21/17   0040  11/20/17 1915   TROPI  <0.015  <0.015  <0.015     Most Recent 6 Bacteria Isolates From Any Culture (See EPIC Reports for Culture Details):  Recent Labs   Lab Test  09/20/16   0710   CULT  >100,000 colonies/mL mixed urogenital nicole Susceptibility testing not routinely   done       Most Recent TSH, T4 and A1c Labs:  Recent Labs   Lab Test  11/20/17 1915   TSH  3.62     Results for orders placed or performed during the hospital encounter of " 11/20/17   Chest XR,  PA & LAT    Narrative    CHEST TWO VIEWS    11/20/2017 8:07 PM     HISTORY: Chest pain.    COMPARISON: None.      Impression    IMPRESSION: Cardiomediastinal silhouette within normal limits. No  pleural effusion. No pneumothorax identified. No focal airspace  opacities. Mild scoliotic curvature of the spine.     ALNDY NY MD   CT Chest Pulmonary Embolism w Contrast    Narrative    CT CHEST WITH CONTRAST   11/20/2017  9:26 PM     HISTORY: Hypoxia. Chest pain.    COMPARISON: None.    TECHNIQUE: Following the uneventful administration of 61 mL Isovue  -370 intravenous contrast, helical sections were acquired through the  lungs according to the pulmonary embolism protocol. Coronal  reconstructions were generated. Radiation dose for this scan was  reduced using automated exposure control, adjustment of the mA and/or  kV according to the patient's size, or iterative reconstruction  technique.    FINDINGS: No visualized pulmonary embolus. The thoracic aorta is  normal in caliber without dissection. Atherosclerotic calcification in  the thoracic aorta and coronary arteries.    Mild atelectasis in the dependent aspects of both lungs. The lungs are  otherwise clear. No pleural or pericardial effusion. No enlarged lymph  nodes in the chest. 1.8 cm left lobe of the thyroid nodule. Small  hiatal hernia.    Scan through the upper abdomen is significant for three subcentimeter  low-attenuation lesions in the liver that are too small to  characterize.      Impression    IMPRESSION:   1. No visualized pulmonary embolus.  2. No evidence of active pulmonary disease.    JUAN BARAHONA MD

## 2018-01-15 ENCOUNTER — HOSPITAL ENCOUNTER (OUTPATIENT)
Facility: CLINIC | Age: 78
End: 2018-01-15
Attending: COLON & RECTAL SURGERY | Admitting: COLON & RECTAL SURGERY
Payer: COMMERCIAL

## 2018-02-20 RX ORDER — LIDOCAINE 40 MG/G
CREAM TOPICAL
Status: CANCELLED | OUTPATIENT
Start: 2018-02-20

## 2018-02-20 RX ORDER — ONDANSETRON 2 MG/ML
4 INJECTION INTRAMUSCULAR; INTRAVENOUS
Status: CANCELLED | OUTPATIENT
Start: 2018-02-20

## 2024-03-15 ENCOUNTER — OFFICE VISIT (OUTPATIENT)
Dept: CARDIOLOGY | Facility: CLINIC | Age: 84
End: 2024-03-15
Payer: COMMERCIAL

## 2024-03-15 VITALS
BODY MASS INDEX: 26.29 KG/M2 | RESPIRATION RATE: 15 BRPM | OXYGEN SATURATION: 97 % | DIASTOLIC BLOOD PRESSURE: 76 MMHG | SYSTOLIC BLOOD PRESSURE: 125 MMHG | HEART RATE: 79 BPM | HEIGHT: 64 IN | WEIGHT: 154 LBS

## 2024-03-15 DIAGNOSIS — R07.2 PRECORDIAL CHEST PAIN: Primary | ICD-10-CM

## 2024-03-15 PROCEDURE — 99205 OFFICE O/P NEW HI 60 MIN: CPT | Performed by: INTERNAL MEDICINE

## 2024-03-15 PROCEDURE — 93000 ELECTROCARDIOGRAM COMPLETE: CPT | Performed by: INTERNAL MEDICINE

## 2024-03-15 RX ORDER — LEVOTHYROXINE, LIOTHYRONINE 19; 4.5 UG/1; UG/1
30 TABLET ORAL DAILY
COMMUNITY
Start: 2022-09-14

## 2024-03-15 NOTE — PROGRESS NOTES
"  SERVICE DATE: March 15, 2024    REFERRING PROVIDER:  Self referred.    PRIMARY CARE PROVIDER:  Maurice, [facility]  [address] [place], [place] [id number]    REASON FOR VISIT:  Chest tightness.  Question of heart murmur.    HISTORY OF PRESENT ILLNESS:  Angelia Hughes is 83 year old female, new to cardiology, accompanied by her daughter Alka.  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  She does take multiple unspecified over-the-counter supplements.  Her BMI is 26.    A few weeks ago, patient says she was having \"an emotional day because I was thinking of my  who  70 years ago\".  In this context, she had an episode of central chest tightness, radiating to the left side of her chest, lasting about 15 to 20 minutes, not associated with other symptoms.  She presented to the urgent care. I reviewed details in Care Everywhere patient.  ECG showed sinus rhythm, no ischemia or arrhythmia, labs including CBC, renal panel, hemoglobin A1c, thyroid panel were normal.  Cardiac biomarkers were not done.  I independently interpreted the outside EKG dated 2024 which showed normal sinus rhythm, normal cardiac intervals, no ischemia or infarct.  This is consistent with her EKG done today.      [facility] and told her she had a cardiac murmur.  This apprised the patient because on previous annual medical checkups, was not auscultated, and she is here for second opinion.    I reviewed other extensive pertinent testing done in Care Everywhere. In 2017, she had an exercise echocardiogram which was positive for ischemia at a good functional capacity, which led to a CT coronary angiogram which showed she has not had any recent nonobstructive CAD " "with a coronary calcium score of 72, placing her in the 50th-75th percentile of risk with normal LVEF of 65%.     She was started at least 2 times a day, and has not had other exertional symptoms.  She is independent, self caring, never tobacco use, no hypertension, diabetes, hyperlipidemia or stroke.    On exam -/76, pulse 79, BMI 26.  Regular heart sounds, no murmur, no carotid bruit.  Lungs are clear to auscultation.  No lower extremity edema.    DIAGNOSES/ASSESSMENT:  Episode of precordial chest pain in a patient with known elevated coronary calcium score 15 years ago.  Patient is 83 years old, has a background of known nonobstructive CAD as documented on CTA in 2007.  This may have progressed.  A stress test is indicated.  Question of cardiac murmur, auscultated at recent primary care visit.  Today, I do need a detailed cardiac auscultation including many maneuvers.  She has no murmur.  Patient was reassured.    PLAN:  Exercise echocardiogram.  Patient is able to walk on a treadmill, not on AV slowing drugs.  No cardiac murmur heard today.  Patient reassured.  Follow-up with me.  Plan of care discussed in detail with patient and her daughter.  Questions answered.      John Davidson MD      New patient.  60 minutes spent by me on the date of the encounter doing chart review, history and exam, documentation and further activities per the note.      The longitudinal plan of care for the condition(s) below were addressed during this visit. Due to the added complexity in care, I will continue to support Angelia in the subsequent management of this condition(s) and with the ongoing continuity of care of this condition(s).    Problem List Items Addressed This Visit as of 3/15/2024   New onset chest pain.         Vitals: /76   Pulse 79   Resp 15   Ht 1.626 m (5' 4\")   Wt 69.9 kg (154 lb)   SpO2 97%   BMI 26.43 kg/m    Wt Readings from Last 5 Encounters:   03/15/24 69.9 kg (154 lb)   11/21/17 " "70.3 kg (154 lb 14.4 oz)   09/20/16 68.9 kg (152 lb)         Orders Placed This Encounter   Procedures    Follow-Up with Cardiology    EKG 12-lead complete w/read - Clinics (performed today)    Exercise Stress Echocardiogram           CURRENT MEDICATIONS:  Current Outpatient Medications   Medication Sig Dispense Refill    ASPIRIN EC PO Take 325 mg by mouth daily liquid      Multiple Vitamins-Minerals (MACULAR HEALTH FORMULA PO) Take 1 tablet by mouth daily      multivitamin, therapeutic with minerals (THERA-VIT-M) TABS tablet Take 1 tablet by mouth daily      NEW MED 2 times daily SYNULINE supplement for A1C, does not know her dosages      NEW MED 2 times daily Arteriosil Supplement for cholesterol      NEW MED daily Marvflix supplement (heart, liver, lung, kidneys)      NEW MED Vision alive supplement      NEW MED Eye promise supplement      NEW MED Eye c unit(s) prime AMD      NEW MED Purity multi super green      NEW MED Hyaluironic acid supplement      NP THYROID 30 MG tablet Take 30 mg by mouth daily      Vitamin D, Cholecalciferol, 10 MCG (400 UNIT) CHEW            ALLERGIES:  Allergies   Allergen Reactions    Codeine Hives    Other [No Clinical Screening - See Comments]      \"all antibiotics\"    Sulfa Antibiotics        PAST MEDICAL HISTORY:    Past Medical History:   Diagnosis Date    Bell's palsy     Elevated coronary artery calcium score        PAST SURGICAL HISTORY:    Past Surgical History:   Procedure Laterality Date    HYSTERECTOMY         FAMILY HISTORY:    Family History   Problem Relation Age of Onset    Coronary Artery Disease No family hx of     Cardiomyopathy No family hx of                          "

## 2024-03-15 NOTE — LETTER
"3/15/2024    [facility]  [address] [address]    RE: Angelia Hughes       Dear Colleague,     I had the pleasure of seeing Angelia Hughes in the [facility].    SERVICE DATE: March 15, 2024    REFERRING PROVIDER:  Self referred.    PRIMARY CARE PROVIDER:  Clinic, [facility]  [address] [place] [id number]    REASON FOR VISIT:  Chest tightness.  Question of heart murmur.    HISTORY OF PRESENT ILLNESS:  Angelia Hughes is 83 year old female, new to cardiology, accompanied by her daughter Alka.  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  She does take multiple unspecified over-the-counter supplements.  Her BMI is 26.    A few weeks ago, patient says she was having \"an emotional day because I was thinking of my  who  70 years ago\".  In this context, she had an episode of central chest tightness, radiating to the left side of her chest, lasting about 15 to 20 minutes, not associated with other symptoms.  She presented to the urgent care. I reviewed details in Care Everywhere patient.  ECG showed sinus rhythm, no ischemia or arrhythmia, labs including CBC, renal panel, hemoglobin A1c, thyroid panel were normal.  Cardiac biomarkers were not done.  I independently interpreted the outside EKG dated 2024 which showed normal sinus rhythm, normal cardiac intervals, no ischemia or infarct.  This is consistent with her EKG done today.      [facility] and told her she had a cardiac murmur.  This apprised the patient because on previous annual medical checkups, was not auscultated, and she is here for second opinion.    I reviewed other extensive pertinent testing done in " Care Everywhere. In 2017, she had an exercise echocardiogram which was positive for ischemia at a good functional capacity, which led to a CT coronary angiogram which showed she has not had any recent nonobstructive CAD with a coronary calcium score of 72, placing her in the 50th-75th percentile of risk with normal LVEF of 65%.     She was started at least 2 times a day, and has not had other exertional symptoms.  She is independent, self caring, never tobacco use, no hypertension, diabetes, hyperlipidemia or stroke.    On exam -/76, pulse 79, BMI 26.  Regular heart sounds, no murmur, no carotid bruit.  Lungs are clear to auscultation.  No lower extremity edema.    DIAGNOSES/ASSESSMENT:  Episode of precordial chest pain in a patient with known elevated coronary calcium score 15 years ago.  Patient is 83 years old, has a background of known nonobstructive CAD as documented on CTA in 2007.  This may have progressed.  A stress test is indicated.  Question of cardiac murmur, auscultated at recent primary care visit.  Today, I do need a detailed cardiac auscultation including many maneuvers.  She has no murmur.  Patient was reassured.    PLAN:  Exercise echocardiogram.  Patient is able to walk on a treadmill, not on AV slowing drugs.  No cardiac murmur heard today.  Patient reassured.  Follow-up with me.  Plan of care discussed in detail with patient and her daughter.  Questions answered.      John Davidson MD      New patient.  60 minutes spent by me on the date of the encounter doing chart review, history and exam, documentation and further activities per the note.      The longitudinal plan of care for the condition(s) below were addressed during this visit. Due to the added complexity in care, I will continue to support Angelia in the subsequent management of this condition(s) and with the ongoing continuity of care of this condition(s).    Problem List Items Addressed This Visit as of 3/15/2024   New  "onset chest pain.         Vitals: /76   Pulse 79   Resp 15   Ht 1.626 m (5' 4\")   Wt 69.9 kg (154 lb)   SpO2 97%   BMI 26.43 kg/m    Wt Readings from Last 5 Encounters:   03/15/24 69.9 kg (154 lb)   11/21/17 70.3 kg (154 lb 14.4 oz)   09/20/16 68.9 kg (152 lb)         Orders Placed This Encounter   Procedures    Follow-Up with Cardiology    EKG 12-lead complete w/read - Clinics (performed today)    Exercise Stress Echocardiogram           CURRENT MEDICATIONS:  Current Outpatient Medications   Medication Sig Dispense Refill    ASPIRIN EC PO Take 325 mg by mouth daily liquid      Multiple Vitamins-Minerals (MACULAR HEALTH FORMULA PO) Take 1 tablet by mouth daily      multivitamin, therapeutic with minerals (THERA-VIT-M) TABS tablet Take 1 tablet by mouth daily      NEW MED 2 times daily SYNULINE supplement for A1C, does not know her dosages      NEW MED 2 times daily Arteriosil Supplement for cholesterol      NEW MED daily Marvflix supplement (heart, liver, lung, kidneys)      NEW MED Vision alive supplement      NEW MED Eye promise supplement      NEW MED Eye c unit(s) prime AMD      NEW MED Purity multi super green      NEW MED Hyaluironic acid supplement      NP THYROID 30 MG tablet Take 30 mg by mouth daily      Vitamin D, Cholecalciferol, 10 MCG (400 UNIT) CHEW            ALLERGIES:  Allergies   Allergen Reactions    Codeine Hives    Other [No Clinical Screening - See Comments]      \"all antibiotics\"    Sulfa Antibiotics        PAST MEDICAL HISTORY:    Past Medical History:   Diagnosis Date    Bell's palsy     Elevated coronary artery calcium score        PAST SURGICAL HISTORY:    Past Surgical History:   Procedure Laterality Date    HYSTERECTOMY         FAMILY HISTORY:    Family History   Problem Relation Age of Onset    Coronary Artery Disease No family hx of     Cardiomyopathy No family hx of            Thank you for allowing me to participate in the care of your patient.      Sincerely, "     John Davidson MD     [facility]  cc:   Referred Self,

## 2024-07-09 PROCEDURE — 88188 FLOWCYTOMETRY/READ 9-15: CPT | Performed by: PATHOLOGY

## 2024-07-09 PROCEDURE — 88185 FLOWCYTOMETRY/TC ADD-ON: CPT | Mod: ORL | Performed by: OPHTHALMOLOGY

## 2024-07-09 PROCEDURE — 88305 TISSUE EXAM BY PATHOLOGIST: CPT | Mod: TC,ORL | Performed by: OPHTHALMOLOGY

## 2024-07-11 ENCOUNTER — LAB REQUISITION (OUTPATIENT)
Dept: LAB | Facility: CLINIC | Age: 84
End: 2024-07-11
Payer: COMMERCIAL

## 2024-07-11 DIAGNOSIS — C83.398 DIFFUSE LARGE B-CELL LYMPHOMA, EXTRANODAL AND SOLID ORGAN SITES: ICD-10-CM

## 2024-07-11 LAB
PATH REPORT.COMMENTS IMP SPEC: NORMAL
PATH REPORT.FINAL DX SPEC: NORMAL
PATH REPORT.MICROSCOPIC SPEC OTHER STN: NORMAL
PATH REPORT.RELEVANT HX SPEC: NORMAL

## 2024-07-12 LAB
PATH REPORT.COMMENTS IMP SPEC: NORMAL
PATH REPORT.FINAL DX SPEC: NORMAL
PATH REPORT.GROSS SPEC: NORMAL
PATH REPORT.MICROSCOPIC SPEC OTHER STN: NORMAL

## 2024-07-12 PROCEDURE — 88305 TISSUE EXAM BY PATHOLOGIST: CPT | Mod: 26 | Performed by: PATHOLOGY

## 2024-09-12 ENCOUNTER — TRANSCRIBE ORDERS (OUTPATIENT)
Dept: OTHER | Age: 84
End: 2024-09-12

## 2024-09-12 DIAGNOSIS — H35.3211 EXUDATIVE AGE-RELATED MACULAR DEGENERATION, RIGHT EYE, WITH ACTIVE CHOROIDAL NEOVASCULARIZATION (H): Primary | ICD-10-CM

## 2024-10-01 ENCOUNTER — THERAPY VISIT (OUTPATIENT)
Dept: OCCUPATIONAL THERAPY | Facility: CLINIC | Age: 84
End: 2024-10-01
Payer: COMMERCIAL

## 2024-10-01 DIAGNOSIS — H35.3211 EXUDATIVE AGE-RELATED MACULAR DEGENERATION OF RIGHT EYE WITH ACTIVE CHOROIDAL NEOVASCULARIZATION (H): Primary | ICD-10-CM

## 2024-10-01 DIAGNOSIS — H54.7 LOW VISION: ICD-10-CM

## 2024-10-01 PROCEDURE — 97165 OT EVAL LOW COMPLEX 30 MIN: CPT | Mod: GO | Performed by: OCCUPATIONAL THERAPIST

## 2024-10-01 PROCEDURE — 97535 SELF CARE MNGMENT TRAINING: CPT | Mod: GO | Performed by: OCCUPATIONAL THERAPIST

## 2024-10-01 NOTE — PROGRESS NOTES
OCCUPATIONAL THERAPY EVALUATION  Type of Visit: Evaluation       Fall Risk Screen:  Fall screen completed by: OT  Have you fallen 2 or more times in the past year?: No  Have you fallen and had an injury in the past year?: No  Is patient a fall risk?: No    Subjective      Presenting condition or subjective complaint: Low VisionLow Vision.    Date of onset: 10/01/23 (date of initial evaluation))10/1/24 (date of initial evaluation, self referred)    Relevant medical history:     Past Medical History:   Diagnosis Date    Bell's palsy     Elevated coronary artery calcium score      Dates & types of surgery:    Past Surgical History:   Procedure Laterality Date    HYSTERECTOMY       Prior diagnostic imaging/testing results:   Refer to medical records for details on imaging and results     Prior therapy history for the same diagnosis, illness or injury: NoNone, is connected with [facility] services for the [facility]      Prior Level of Function  Transfers: Independent  Ambulation: Independent  ADL: Independent  IADL: Housekeeping, Laundry, Meal preparation, Medication management    Living Environment  Social support: Alone   Type of home:   House  Stairs to enter the home:   3 from garage and 1 to back patio       Ramp:   No  Stairs inside the home:   None, one to patio.       Help at home: Assist for driving and community activities  Equipment owned:   None     Employment:  Retired     Hobbies/Interests: Loves to read, does some journaling      Patient goals for therapy: Increased ease and efficiency in reading and other visual tasks     Pain assessment: Pain denied     Objective   LOW VISION EVALUATION  ADDITIONAL HISTORY:  Current Responsibilities - IADLS: Housekeeping, Laundry, Meal preparation, Medication management    Others present at visit: Child(radha)    COGNITIVE/BEHAVIORAL:  Communication: Intact  Cognitive Status: Oriented to person, place and time  Behavior: Appropriate    Physical Status/Equipment   Physical Status:  Independent in ambulation and functional transfers without the use of AE   Mobility Equipment Used: None    VISUAL REPORT:  Functional complaints: Avocational tasks, Homemaking, Leisure, Reading, Writing  Visual Complaints: Constricted visual fields right eye, Phantom vision, Light sensitivity  Jonathan Bonnet Symptoms? Yes   Magnifiers and Low Vision AE owned: Handheld magnifier no light  Reading Glasses: Trifocal (lined)  Power per MD report: Unknown   Technology: Desktop    LIGHTING AND GLARE:  Is your lighting adequate? No at home  Is glare a problem? Yes indoors, Yes outdoors    Sunglass Details:  Unknown     VISUAL ACUITY:  Patient reports that her visual acuity is 20/50, 20/60, 20/70- not able to locate in medical records.      CONTRAST SENSITIVITY:  Contrast Sensitivity (score/25): 15/25    PREFERRED RETINAL LOCUS:  Right Eye:  NT   Right Eye Eccentric Viewing Position:  NT     Left Eye:  NT  Left Eye Eccentric Viewing Position:  NT    MN Read  Smallest Print Size Read: At 16 inches, ambient lighting=3.2M.  AT 16 inches with task light (medium color, bright light)=2.5.  At any distance (5 inches)=1M   Critical Print Size: 4M  Words per minute at critical print size: 55     Visual Field:  Central Visual Field: Abnormal.  Right eye missing center and superior visual field with inferior noted as blurry.  Left eye with indication of 12 blurry.    Central Visual Field Screen Used: Clock Face    SRAFVP Scores:  Relevant Measures: 36  Composite Score: 42  Percentage of Disability: 41.67    Assessment & Plan   CLINICAL IMPRESSIONS  Medical Diagnosis: Macular Degeneration    Treatment Diagnosis: Decreased baseline ease and efficiency in I/ADL's    Impression/Assessment: Pt is a 84 year old female presenting to Occupational Therapy due to Low Vision.  The following significant findings have been identified: Impaired communication, Impaired mobility, and Impaired visual perception.  These identified deficits interfere  with their ability to perform self care tasks, recreational activities, household mobility, community mobility, medication management, financial management, meal planning and preparation, and community or volunteer activities as compared to previous level of function.     Clinical Decision Making (Complexity):  Assessment of Occupational Performance: 5 or more Performance Deficits  Occupational Performance Limitations: dressing, driving and community mobility, health management and maintenance, home establishment and management, meal preparation and cleanup, and leisure activities  Clinical Decision Making (Complexity): Low complexity    PLAN OF CARE  Treatment Interventions:  Interventions: Self-Care/Home Management    Long Term Goals   OT Goal 1  Goal Identifier: Near Vision  Goal Description: Patient will demonstrate 3 pieces of adaptive equipment and/or adaptive techniques in regards to magnification, lighting, contrast and glare for increased ADL/IADL independence with near vision tasks (reading, meal prep, medication management, writing).  Rationale: In order to maximize safety and independence with performance of self-care activities;In order to safely and appropriately apply compensatory strategies with ADL/IADL performance  Target Date: 12/31/24  OT Goal 2  Goal Identifier: Environmental modifications  Goal Description: Patient will demonstrate and/or verbalize 3 environmentally-based ADL modifications to improve visual-based ADL/IADL activities.  Rationale: In order to maximize safety and independence with performance of self-care activities;In order to safely and appropriately apply compensatory strategies with ADL/IADL performance  Target Date: 12/31/24  OT Goal 3  Goal Identifier: Resource Education  Goal Description: Patient will verbalize awareness of 2 community resources for increased ADL/IADL completion.  Rationale: In order to maximize safety and independence with performance of self-care  activities  Target Date: 12/31/24  OT Goal 4  Goal Identifier: Distance Viewing  Goal Description: Patient will demonstrate increased independence in distance viewing for safety and leisure during ADL/IADLs through independent use of at least one adaptive technique or AE.  Rationale: In order to maximize safety and independence with performance of self-care activities;In order to safely and appropriately apply compensatory strategies with ADL/IADL performance  Target Date: 12/31/24      Frequency of Treatment: 1x/week  Duration of Treatment: Up to 6 months (extended for scheduling conflicts)     Recommended Referrals to Other Professionals:  None indicated   Education Assessment: Learner/Method: Patient;Listening;Reading;Demonstration;Family  Education Comments: Patient was educated on various strategies to increase ease of reading, including reading large print books and trialing of high powered single lens readers with similar response noted- generally an increase in efficiency across both 4+ and large print.     Risks and benefits of evaluation/treatment have been explained.   Patient/Family/caregiver agrees with Plan of Care.     Evaluation Time:    OT Breezy Low Complexity Minutes (37778): 45   Present: Not applicable     Signing Clinician: Ilana Howell OT        [facility]                                                                                   OUTPATIENT OCCUPATIONAL THERAPY      PLAN OF TREATMENT FOR OUTPATIENT REHABILITATION   Patient's Last Name, First Name, MAngelia Gagnon YOB: 1940   Provider's Name   [facility]   Medical Record No.  [id number]     Onset Date: 10/01/23 (date of initial evaluation)) Start of Care Date: 10/01/24     Medical Diagnosis:  Macular Degeneration      OT Treatment Diagnosis:  Decreased baseline ease and efficiency in I/ADL's Plan of  Treatment  Frequency/Duration:1x/week/Up to 6 months (extended for scheduling conflicts)    Certification date from 10/01/24   To 12/31/24        See note for plan of treatment details and functional goals     Ilana Howell OT                         I CERTIFY THE NEED FOR THESE SERVICES FURNISHED UNDER        THIS PLAN OF TREATMENT AND WHILE UNDER MY CARE .             Physician Signature               Date    X_____________________________________________________                  Referring Provider:  Referred Self    Initial Assessment  See Epic Evaluation- 10/01/24